# Patient Record
Sex: FEMALE | Race: WHITE | Employment: UNEMPLOYED | RURAL
[De-identification: names, ages, dates, MRNs, and addresses within clinical notes are randomized per-mention and may not be internally consistent; named-entity substitution may affect disease eponyms.]

---
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
## 2022-05-20 ENCOUNTER — HOSPITAL ENCOUNTER (OUTPATIENT)
Dept: ULTRASOUND IMAGING | Facility: HOSPITAL | Age: 22
Discharge: HOME | End: 2022-05-20
Payer: MEDICAID

## 2022-05-20 DIAGNOSIS — N91.2 AMENORRHEA: ICD-10-CM

## 2022-05-20 PROCEDURE — 76830 TRANSVAGINAL US NON-OB: CPT

## 2022-06-09 ENCOUNTER — OFFICE VISIT (OUTPATIENT)
Dept: OBSTETRICS AND GYNECOLOGY | Facility: CLINIC | Age: 22
End: 2022-06-09

## 2022-06-09 VITALS
HEIGHT: 65 IN | DIASTOLIC BLOOD PRESSURE: 70 MMHG | WEIGHT: 293 LBS | BODY MASS INDEX: 48.82 KG/M2 | SYSTOLIC BLOOD PRESSURE: 116 MMHG

## 2022-06-09 DIAGNOSIS — N91.5 OLIGOMENORRHEA, UNSPECIFIED TYPE: ICD-10-CM

## 2022-06-09 DIAGNOSIS — N92.6 IRREGULAR PERIODS: Primary | ICD-10-CM

## 2022-06-09 DIAGNOSIS — E66.01 MORBID OBESITY WITH BMI OF 50.0-59.9, ADULT: ICD-10-CM

## 2022-06-09 DIAGNOSIS — Z12.4 SCREENING FOR CERVICAL CANCER: ICD-10-CM

## 2022-06-09 DIAGNOSIS — Z11.3 SCREENING EXAMINATION FOR STD (SEXUALLY TRANSMITTED DISEASE): ICD-10-CM

## 2022-06-09 LAB
B-HCG UR QL: NEGATIVE
EXPIRATION DATE: NORMAL
INTERNAL NEGATIVE CONTROL: NEGATIVE
INTERNAL POSITIVE CONTROL: POSITIVE
Lab: NORMAL

## 2022-06-09 PROCEDURE — 99204 OFFICE O/P NEW MOD 45 MIN: CPT | Performed by: PHYSICIAN ASSISTANT

## 2022-06-09 PROCEDURE — 81025 URINE PREGNANCY TEST: CPT | Performed by: PHYSICIAN ASSISTANT

## 2022-06-09 RX ORDER — MEDROXYPROGESTERONE ACETATE 10 MG/1
10 TABLET ORAL DAILY
Qty: 10 TABLET | Refills: 0 | OUTPATIENT
Start: 2022-06-09 | End: 2022-07-14

## 2022-06-09 NOTE — PROGRESS NOTES
Subjective   Chief Complaint   Patient presents with   • Menstrual Problem     C/O irregular periods, Patient states she goes months without a period.  TVS in chart, had labs done at [facility].  Patient has never had a pap       Irena Gaines is a 22 y.o. year old  presenting to be seen for irregular menses.  Patient's last menstrual period started 5/15/2022 and she is still bleeding lightly today.  She has a history of amenorrhea and oligomenorrhea and has gone 4 to 5 months between menses in the past.  Reports that [facility] in [place] recently did PCOS labs in May and those results are not available.  She also had a recent pelvic ultrasound which noted normal uterus and bilateral normal ovaries.  She is sexually active.  States her partner is 30 years old and infertile.  She has complaints of hair loss some scattered dark coarse hairs on her chin.  Menarche was at age 14 and she had regular cycles until the age of 16 or 17 when menses began spacing out.    History reviewed. No pertinent past medical history.     Current Outpatient Medications:   •  medroxyPROGESTERone (Provera) 10 MG tablet, Take 1 tablet by mouth Daily., Disp: 10 tablet, Rfl: 0  •  metFORMIN (Glucophage) 500 MG tablet, One tablet po with evening meal for one week then bid with meals, Disp: 60 tablet, Rfl: 6  •  Progesterone (Prometrium) 200 MG capsule, One capsule po days 1-12 of the month, Disp: 12 capsule, Rfl: 6   No Known Allergies   Past Surgical History:   Procedure Laterality Date   • WISDOM TOOTH EXTRACTION  2017      Social History     Socioeconomic History   • Marital status: Single   Tobacco Use   • Smoking status: Never Smoker   • Smokeless tobacco: Never Used   Vaping Use   • Vaping Use: Never used   Substance and Sexual Activity   • Alcohol use: Yes     Comment: Only every once in a while   • Drug use: Never   • Sexual activity: Yes     Partners: Male     Birth control/protection: None      Family History   Problem  "Relation Age of Onset   • Diabetes Father    • Diabetes Paternal Grandmother    • Breast cancer Maternal Aunt        Review of Systems   Constitutional: Negative for chills, diaphoresis and fever.   Gastrointestinal: Negative for constipation, diarrhea, nausea and vomiting.   Genitourinary: Positive for menstrual problem and vaginal bleeding. Negative for difficulty urinating, dysuria and pelvic pain.           Objective   /70   Ht 165.1 cm (65\")   Wt (!) 140 kg (308 lb 3.2 oz)   LMP 05/15/2022 (Exact Date)   Breastfeeding No   BMI 51.29 kg/m²     Physical Exam  Constitutional:       Appearance: Normal appearance. She is well-developed and well-groomed.   Eyes:      General: Lids are normal.      Extraocular Movements: Extraocular movements intact.      Conjunctiva/sclera: Conjunctivae normal.   Genitourinary:     Labia:         Right: No rash, tenderness or lesion.         Left: No rash, tenderness or lesion.       Urethra: No prolapse, urethral pain, urethral swelling or urethral lesion.      Vagina: No signs of injury. Bleeding present. No tenderness or lesions.      Cervix: No cervical motion tenderness, friability or lesion.      Uterus: Not enlarged and not tender.       Adnexa:         Right: No mass or tenderness.          Left: No mass or tenderness.        Comments: Minimal bleeding observed  Skin:     General: Skin is warm and dry.      Findings: No bruising or lesion.      Comments: Acanthosis posterior neck and sides of neck   Neurological:      Mental Status: She is alert.   Psychiatric:         Attention and Perception: Attention normal.         Mood and Affect: Mood normal.         Speech: Speech normal.         Behavior: Behavior is cooperative.            Result Review :   The following data was reviewed by: Dalia Trinh PA-C on 06/09/2022:    Data reviewed: pelvic ultrasound report            Assessment and Plan  Diagnoses and all orders for this visit:    1. Irregular periods " (Primary)  -     POC Pregnancy, Urine    2. Screening for cervical cancer  -     LIQUID-BASED PAP SMEAR, [facility] ([facility])    3. Screening examination for STD (sexually transmitted disease)  -     LIQUID-BASED PAP SMEAR, [facility] ([facility])    4. Morbid obesity with BMI of 50.0-59.9, adult (HCC)    5. Oligomenorrhea, unspecified type    Other orders  -     medroxyPROGESTERone (Provera) 10 MG tablet; Take 1 tablet by mouth Daily.  Dispense: 10 tablet; Refill: 0      Patient Instructions   Request PCOS labs from [facility]. Pending review will discuss cycle regulation with cyclic progesterone vs hormonal contraception  Provera now for 10 days to stop bleeding  Also will discuss metformin pending review of labs             This note was electronically signed.    Dalia Trinh PA-C   June 16, 2022

## 2022-06-09 NOTE — PATIENT INSTRUCTIONS
Request PCOS labs from [facility]. Pending review will discuss cycle regulation with cyclic progesterone vs hormonal contraception  Provera now for 10 days to stop bleeding  Also will discuss metformin pending review of labs

## 2022-06-14 LAB — REF LAB TEST METHOD: NORMAL

## 2022-06-16 ENCOUNTER — TELEPHONE (OUTPATIENT)
Dept: OBSTETRICS AND GYNECOLOGY | Facility: CLINIC | Age: 22
End: 2022-06-16

## 2022-06-16 RX ORDER — PROGESTERONE 200 MG/1
CAPSULE ORAL
Qty: 12 CAPSULE | Refills: 6 | Status: SHIPPED | OUTPATIENT
Start: 2022-06-16

## 2022-06-16 NOTE — TELEPHONE ENCOUNTER
Patient returned your call regarding lab results. Informed her of your message and she would like a call back to discuss medications.  She said she would be able to talk today at 1:30 or after 4.    Call back:[phone]

## 2022-06-16 NOTE — TELEPHONE ENCOUNTER
Returned call to patient and discussed her insulin level and hemoglobin A1c.  She would like to go ahead and begin metformin and would like to do cyclic progesterone instead of an OCP.  Prescriptions are sent to her pharmacy and she is advised to follow-up in the office and 3 to 4 months.

## 2022-09-27 ENCOUNTER — APPOINTMENT (OUTPATIENT)
Dept: GENERAL RADIOLOGY | Facility: HOSPITAL | Age: 22
End: 2022-09-27
Payer: MEDICAID

## 2022-09-27 ENCOUNTER — HOSPITAL ENCOUNTER (EMERGENCY)
Facility: HOSPITAL | Age: 22
Discharge: HOME | End: 2022-09-27
Attending: EMERGENCY MEDICINE
Payer: MEDICAID

## 2022-09-27 VITALS — WEIGHT: 293 LBS | HEIGHT: 65 IN | BODY MASS INDEX: 48.82 KG/M2

## 2022-09-27 DIAGNOSIS — S93.402A SPRAIN OF LEFT ANKLE, UNSPECIFIED LIGAMENT, INITIAL ENCOUNTER: Primary | ICD-10-CM

## 2022-09-27 PROCEDURE — 99283 EMERGENCY DEPT VISIT LOW MDM: CPT

## 2022-09-27 PROCEDURE — 6370000000 HC RX 637 (ALT 250 FOR IP): Performed by: EMERGENCY MEDICINE

## 2022-09-27 PROCEDURE — 73610 X-RAY EXAM OF ANKLE: CPT

## 2022-09-27 RX ORDER — IBUPROFEN 800 MG/1
800 TABLET ORAL ONCE
Status: COMPLETED | OUTPATIENT
Start: 2022-09-27 | End: 2022-09-27

## 2022-09-27 RX ORDER — IBUPROFEN 800 MG/1
800 TABLET ORAL 4 TIMES DAILY PRN
Qty: 40 TABLET | Refills: 0 | Status: SHIPPED | OUTPATIENT
Start: 2022-09-27

## 2022-09-27 RX ADMIN — IBUPROFEN 800 MG: 800 TABLET, FILM COATED ORAL at 15:27

## 2022-09-27 ASSESSMENT — PAIN DESCRIPTION - ORIENTATION: ORIENTATION: LEFT

## 2022-09-27 ASSESSMENT — PAIN DESCRIPTION - PAIN TYPE: TYPE: ACUTE PAIN

## 2022-09-27 ASSESSMENT — PAIN SCALES - GENERAL
PAINLEVEL_OUTOF10: 8
PAINLEVEL_OUTOF10: 7

## 2022-09-27 ASSESSMENT — LIFESTYLE VARIABLES
HOW OFTEN DO YOU HAVE A DRINK CONTAINING ALCOHOL: MONTHLY OR LESS
HOW MANY STANDARD DRINKS CONTAINING ALCOHOL DO YOU HAVE ON A TYPICAL DAY: 1 OR 2

## 2022-09-27 ASSESSMENT — PAIN DESCRIPTION - LOCATION: LOCATION: ANKLE;FOOT

## 2022-09-27 NOTE — ED PROVIDER NOTES
[facility] ENCOUNTER      Pt Name: Anette Lucio  MRN: [id number]  YOB: 2000  Date of evaluation: 9/27/2022  Provider: Katheryn Earl MD    [address] [place] [address]       Chief Complaint   Patient presents with    Ankle Pain     Liam Eric this AM         HISTORY OF PRESENT ILLNESS  (Location/Symptom, Timing/Onset, Context/Setting, Quality, Duration, Modifying Factors, Severity.)   Anette Lucio is a 25 y.o. female who presents to the emergency department complaining that she was walking out her door carrying some things for her wedding this weekend when she tripped over step fell down onto her knees twisting her left ankle this happened about 5 hours ago she now complains of anterior ankle pain radiating around medially and some swelling denies any numbness she has not taken anything for any for pain yet she has pain with movement of her toes or ankle she did not sustain any other injury. Nursing notes were reviewed. REVIEW OFSYSTEMS    (2-9 systems for level 4, 10 or more for level 5)   ROS:  General:  No fevers, no chills, no weakness  Cardiovascular:  No chest pain, no palpitations  Respiratory:  No shortness of breath, no cough, no wheezing  Gastrointestinal:  No pain, no nausea, no vomiting, no diarrhea  Musculoskeletal:  + muscle pain, + joint pain  Skin:  No rash, no easy bruising  Neurologic:  No speech problems, no headache, no extremity weakness  Psychiatric:  No anxiety  Genitourinary:  No dysuria, no hematuria    Except as noted above the remainder of the review of systems was reviewed and negative.        PAST MEDICAL HISTORY     Past Medical History:   Diagnosis Date    Pre-diabetes          SURGICAL HISTORY       Past Surgical History:   Procedure Laterality Date    WISDOM TOOTH EXTRACTION           CURRENT MEDICATIONS       Previous Medications    No medications on file       ALLERGIES     Patient has no known allergies. FAMILY HISTORY       Family History   Problem Relation Age of Onset    Diabetes Paternal Grandmother           SOCIAL HISTORY       Social History     Socioeconomic History    Marital status: Single     Spouse name: None    Number of children: None    Years of education: None    Highest education level: None   Tobacco Use    Smoking status: Never    Smokeless tobacco: Never   Substance and Sexual Activity    Alcohol use: No    Drug use: No         PHYSICAL EXAM    (up to 7 for level 4, 8 or more for level 5)     ED Triage Vitals   BP Temp Temp src Pulse Resp SpO2 Height Weight   -- -- -- -- -- -- -- --       Physical Exam  General :Patient is awake, alert, oriented, in no acute distress, nontoxic appearing  HEENT: Pupils are equally round and reactive to light, EOMI, conjunctivae clear. Neck: Neck is supple, full range of motion, trachea midline    Musculoskeletal: 5 out of 5 strength in all 4 extremities. No focal muscle deficits are appreciated left ankle somewhat swollen medially she has 2+ posterior tib and dorsal pedal pulses sensations intact to the foot she has decreased range of motion of the toes and ankle secondary to pain. She is tender over the proximal medial foot and medial ankle. Neuro: Motor intact, sensory intact, level of consciousness is normal, Dermatology: Skin is warm and dry  Psych: Mentation is grossly normal, cognition is grossly normal. Affect is appropriate.       DIAGNOSTIC RESULTS     EKG: All EKG's are interpreted by the Emergency Department Physician who either signs or Co-signs this chart in the [address] a cardiologist.    The EKG interpreted by me shows     RADIOLOGY:   Non-plain film images such as CT, Ultrasound and MRI are read by the radiologist. Plain radiographic images are visualized and preliminarily interpreted by the emergency physician with the below findings:      [] Radiologist's Report Reviewed:  XR ANKLE LEFT (MIN 3 VIEWS)   Final Result   Impression: No acute abnormality. ED BEDSIDE ULTRASOUND:   Performed by ED Physician - none    LABS:    I have reviewed and interpreted all of the currently available lab results from this visit (ifapplicable):  No results found for this visit on 09/27/22. All other labs were within normal range or not returned as of this dictation. EMERGENCY DEPARTMENT COURSE and DIFFERENTIAL DIAGNOSIS/MDM:   Vitals:    Vitals:    09/27/22 1523   Weight: 300 lb (136.1 kg)   Height: 5' 5\" (1.651 m)       MEDICATIONS ADMINISTERED IN ED:  Medications   ibuprofen (ADVIL;MOTRIN) tablet 800 mg (800 mg Oral Given 9/27/22 1527)       Patient has been stable x-ray shows no evidence of fracture dislocation as I discussed with the patient this appears to be an ankle sprain we will place her in a stirrup splint splint for support and call her in anti-inflammatory pain medication for the next couple of days. She should follow-up with her family doctor or orthopedist in 2 or 3 days especially if she is not pretty much back to normal.      The patient will follow-up with their PCP in 1-2 days for reevaluation. If the patient or family members have anyfurther concerns or any worsening symptoms they will return to the ED for reevaluation. Is this patient to be included in the SEP-1 Core Measure due to severe sepsis or septic shock? No   Exclusion criteria - the patient is NOT to be included for SEP-1 Core Measure due to: Alternative explanation for abnormal labs/vitals that do not relate to sepsis, see MDM for further explanation          CONSULTS:  None    PROCEDURES:  Procedures    CRITICAL CARE TIME    Total Critical Care time was 0 minutes, excluding separately reportable procedures. There was a high probability of clinically significant/life threatening deterioration in the patient's condition which required my urgent intervention. FINAL IMPRESSION      1.  Sprain of left ankle, unspecified ligament, initial encounter DISPOSITION/PLAN   DISPOSITION Decision To Discharge 09/27/2022 03:27:03 PM  discharge to home    PATIENT REFERRED TO:  Abdulkadir Torres [address] [id number]  [phone]    Schedule an appointment as soon as possible for a visit in 3 days      DISCHARGE MEDICATIONS:  New Prescriptions    IBUPROFEN (ADVIL;MOTRIN) 800 MG TABLET    Take 1 tablet by mouth 4 times daily as needed for Pain       Comment: Please note this report has been produced using speech recognition software and may contain errorsrelated to that system including errors in grammar, punctuation, and spelling, as well as words and phrases that may be inappropriate. If there are any questions or concerns please feel free to contact the dictating providerfor clarification.     Farheen Daugherty MD  Attending Emergency Physician               Farheen Daugherty MD  09/27/22 2926

## 2022-09-27 NOTE — ED NOTES
Dc instructions given to patient at this time, patient with no other questions or concerns.      Keeley Ramsey RN  09/27/22 2445